# Patient Record
Sex: FEMALE | Race: BLACK OR AFRICAN AMERICAN | NOT HISPANIC OR LATINO | URBAN - METROPOLITAN AREA
[De-identification: names, ages, dates, MRNs, and addresses within clinical notes are randomized per-mention and may not be internally consistent; named-entity substitution may affect disease eponyms.]

---

## 2021-04-14 ENCOUNTER — EMERGENCY (EMERGENCY)
Facility: HOSPITAL | Age: 26
LOS: 1 days | Discharge: HOME | End: 2021-04-14
Attending: EMERGENCY MEDICINE | Admitting: EMERGENCY MEDICINE
Payer: COMMERCIAL

## 2021-04-14 VITALS
RESPIRATION RATE: 16 BRPM | WEIGHT: 132.06 LBS | DIASTOLIC BLOOD PRESSURE: 86 MMHG | HEIGHT: 63 IN | HEART RATE: 101 BPM | TEMPERATURE: 98 F | OXYGEN SATURATION: 99 % | SYSTOLIC BLOOD PRESSURE: 126 MMHG

## 2021-04-14 DIAGNOSIS — Z3A.01 LESS THAN 8 WEEKS GESTATION OF PREGNANCY: ICD-10-CM

## 2021-04-14 DIAGNOSIS — O99.891 OTHER SPECIFIED DISEASES AND CONDITIONS COMPLICATING PREGNANCY: ICD-10-CM

## 2021-04-14 DIAGNOSIS — R00.0 TACHYCARDIA, UNSPECIFIED: ICD-10-CM

## 2021-04-14 DIAGNOSIS — Z20.822 CONTACT WITH AND (SUSPECTED) EXPOSURE TO COVID-19: ICD-10-CM

## 2021-04-14 LAB
APTT BLD: 30.5 SEC — SIGNIFICANT CHANGE UP (ref 27.5–35.5)
BLD GP AB SCN SERPL QL: NEGATIVE — SIGNIFICANT CHANGE UP
HCT VFR BLD CALC: 32.8 % — LOW (ref 34.5–45)
HGB BLD-MCNC: 11.9 G/DL — SIGNIFICANT CHANGE UP (ref 11.5–15.5)
INR BLD: 1.14 — SIGNIFICANT CHANGE UP (ref 0.88–1.16)
MCHC RBC-ENTMCNC: 26.9 PG — LOW (ref 27–34)
MCHC RBC-ENTMCNC: 36.3 GM/DL — HIGH (ref 32–36)
MCV RBC AUTO: 74.2 FL — LOW (ref 80–100)
NRBC # BLD: 0 /100 WBCS — SIGNIFICANT CHANGE UP (ref 0–0)
PLATELET # BLD AUTO: 350 K/UL — SIGNIFICANT CHANGE UP (ref 150–400)
PROTHROM AB SERPL-ACNC: 13.6 SEC — SIGNIFICANT CHANGE UP (ref 10.6–13.6)
RBC # BLD: 4.42 M/UL — SIGNIFICANT CHANGE UP (ref 3.8–5.2)
RBC # FLD: 13.7 % — SIGNIFICANT CHANGE UP (ref 10.3–14.5)
RH IG SCN BLD-IMP: POSITIVE — SIGNIFICANT CHANGE UP
WBC # BLD: 11.34 K/UL — HIGH (ref 3.8–10.5)
WBC # FLD AUTO: 11.34 K/UL — HIGH (ref 3.8–10.5)

## 2021-04-14 PROCEDURE — 84702 CHORIONIC GONADOTROPIN TEST: CPT

## 2021-04-14 PROCEDURE — 99284 EMERGENCY DEPT VISIT MOD MDM: CPT

## 2021-04-14 PROCEDURE — 86901 BLOOD TYPING SEROLOGIC RH(D): CPT

## 2021-04-14 PROCEDURE — 80053 COMPREHEN METABOLIC PANEL: CPT

## 2021-04-14 PROCEDURE — 76817 TRANSVAGINAL US OBSTETRIC: CPT

## 2021-04-14 PROCEDURE — 81001 URINALYSIS AUTO W/SCOPE: CPT

## 2021-04-14 PROCEDURE — 86850 RBC ANTIBODY SCREEN: CPT

## 2021-04-14 PROCEDURE — 85730 THROMBOPLASTIN TIME PARTIAL: CPT

## 2021-04-14 PROCEDURE — 86900 BLOOD TYPING SEROLOGIC ABO: CPT

## 2021-04-14 PROCEDURE — 87086 URINE CULTURE/COLONY COUNT: CPT

## 2021-04-14 PROCEDURE — 76801 OB US < 14 WKS SINGLE FETUS: CPT | Mod: 26

## 2021-04-14 PROCEDURE — 76801 OB US < 14 WKS SINGLE FETUS: CPT

## 2021-04-14 PROCEDURE — 85027 COMPLETE CBC AUTOMATED: CPT

## 2021-04-14 PROCEDURE — 76817 TRANSVAGINAL US OBSTETRIC: CPT | Mod: 26

## 2021-04-14 PROCEDURE — U0003: CPT

## 2021-04-14 PROCEDURE — 36415 COLL VENOUS BLD VENIPUNCTURE: CPT

## 2021-04-14 PROCEDURE — 85025 COMPLETE CBC W/AUTO DIFF WBC: CPT

## 2021-04-14 PROCEDURE — 85610 PROTHROMBIN TIME: CPT

## 2021-04-14 PROCEDURE — U0005: CPT

## 2021-04-14 PROCEDURE — 99285 EMERGENCY DEPT VISIT HI MDM: CPT

## 2021-04-14 RX ORDER — SODIUM CHLORIDE 9 MG/ML
3 INJECTION INTRAMUSCULAR; INTRAVENOUS; SUBCUTANEOUS EVERY 8 HOURS
Refills: 0 | Status: DISCONTINUED | OUTPATIENT
Start: 2021-04-14 | End: 2021-04-18

## 2021-04-14 NOTE — CONSULT NOTE ADULT - SUBJECTIVE AND OBJECTIVE BOX
26yo  at 5w2d by LMP 3/8 presents here after abnormal finding on sonogram out [facility].  She denies any pain, vaginal bleeding, lightheadedness, SOB, palpitations, etc.  States she took a UPT at home that was + x2 and went to [facility] for her first prenatal appointment.  On sonogram today in office, the provider was concerned for free fluid and an abnormal looking adnexal structure so the patient was sent here.  She remains asymptomatic.  Pt denies fever, chills, chest pain, SOB, abdominal pain, nausea, vomiting, vaginal bleeding.      OB/GYN Hx: G1 - miscarriage v chemical pregnancy at 2w GA, G2 - current, desired; hx of 1x ovarian cyst managed by observation; denies STIs/fibroids  PMHx: eczema  SHx: intussusception repair as a child, hernia repair as a child  Meds: cyproheptidine PO PRN for eczema  Allergies: NKDA    PHYSICAL EXAM:   Vital Signs Last 24 Hrs  T(C): 36.8 (2021 18:53), Max: 36.8 (2021 18:53)  T(F): 98.3 (2021 18:53), Max: 98.3 (2021 18:53)  HR: 101 (2021 18:53) (101 - 101)  BP: 126/86 (2021 18:53) (126/86 - 126/86)  BP(mean): --  RR: 16 (2021 18:53) (16 - 16)  SpO2: 99% (2021 18:53) (99% - 99%)    **************************  Constitutional: Alert & Oriented x3, No acute distress  Respiratory: Clear to ausculation bilaterally; no wheezing, rhonchi, or crackles  Cardiovascular: regular rate and rhythm, no murmurs, or gallops  Gastrointestinal: soft, non tender, positive bowel sounds, no rebound or guarding   Pelvic exam: normal appearing external genitalia, normal appearing vaginal mucosa, normal appearing cervix - appears closed, no abnormal vaginal discharge, no vaginal bleeding; no CMT, no adnexal masses or fullness palpated, nontender to palpation  Extremities: no calf tenderness or swelling      LABS:                        12.8   10.80 )-----------( 262      ( 2021 19:30 )             36.5         135  |  100  |  11  ----------------------------<  90  4.5   |  21<L>  |  0.62    Ca    9.6      2021 19:30    TPro  7.9  /  Alb  4.7  /  TBili  0.4  /  DBili  x   /  AST  23  /  ALT  11  /  AlkPhos  79  -14    PT/INR - ( 2021 20:37 )   PT: 13.6 sec;   INR: 1.14          PTT - ( 2021 20:37 )  PTT:30.5 sec    HCG Quantitative, Serum: 1089 mIU/mL ( @ 19:30)      RADIOLOGY & ADDITIONAL STUDIES:  Repeat TVUS pending 24yo  at 5w2d by LMP 3/8 presents here after abnormal finding on sonogram out [facility].  She denies any pain, vaginal bleeding, lightheadedness, SOB, palpitations, etc.  States she took a UPT at home that was + x2 and went to [facility] for her first prenatal appointment.  On sonogram today in office, the provider was concerned for free fluid and an abnormal looking adnexal structure so the patient was sent here.  She remains asymptomatic.  Pt denies fever, chills, chest pain, SOB, abdominal pain, nausea, vomiting, vaginal bleeding.      OB/GYN Hx: G1 - miscarriage v chemical pregnancy at 2w GA, G2 - current, desired; hx of 1x ovarian cyst managed by observation; denies STIs/fibroids  PMHx: eczema  SHx: intussusception repair as a child, hernia repair as a child  Meds: cyproheptidine PO PRN for eczema  Allergies: NKDA    PHYSICAL EXAM:   Vital Signs Last 24 Hrs  T(C): 36.8 (2021 18:53), Max: 36.8 (2021 18:53)  T(F): 98.3 (2021 18:53), Max: 98.3 (2021 18:53)  HR: 101 (2021 18:53) (101 - 101)  BP: 126/86 (2021 18:53) (126/86 - 126/86)  BP(mean): --  RR: 16 (2021 18:53) (16 - 16)  SpO2: 99% (2021 18:53) (99% - 99%)    **************************  Constitutional: Alert & Oriented x3, No acute distress  Respiratory: Clear to ausculation bilaterally; no wheezing, rhonchi, or crackles  Cardiovascular: regular rate and rhythm, no murmurs, or gallops  Gastrointestinal: soft, non tender, positive bowel sounds, no rebound or guarding   Pelvic exam: normal appearing external genitalia, normal appearing vaginal mucosa, normal appearing cervix - appears closed, no abnormal vaginal discharge, no vaginal bleeding; no CMT, no adnexal masses or fullness palpated, nontender to palpation  Extremities: no calf tenderness or swelling      LABS:                        12.8   10.80 )-----------( 262      ( 2021 19:30 )             36.5         135  |  100  |  11  ----------------------------<  90  4.5   |  21<L>  |  0.62    Ca    9.6      2021 19:30    TPro  7.9  /  Alb  4.7  /  TBili  0.4  /  DBili  x   /  AST  23  /  ALT  11  /  AlkPhos  79  -14    PT/INR - ( 2021 20:37 )   PT: 13.6 sec;   INR: 1.14          PTT - ( 2021 20:37 )  PTT:30.5 sec    HCG Quantitative, Serum: 1089 mIU/mL ( @ 19:30)      RADIOLOGY & ADDITIONAL STUDIES:  Repeat TVUS final read pending

## 2021-04-14 NOTE — ED PROVIDER NOTE - PROGRESS NOTE DETAILS
GYN consult called. US and lab results d/w GYN and GYN has evaluated pt.  ? IUP but cannot r/o corpus luteum or ruptured ectopic.  Patient reevaluated: remains completely asymptomatic, has no pain, bleeding, lightheadedness.  GYN cleared for DC to return on Friday for repeat HCG and repeat US.  Discussed at length with patient and her partner the importance of this in case of ectopic.  Strict return precautions given and both expressed clear understanding with teach-back.

## 2021-04-14 NOTE — ED PROVIDER NOTE - CLINICAL SUMMARY MEDICAL DECISION MAKING FREE TEXT BOX
Sent to ED for ? ectopic/preg unknown location.  Completely asymptomatic.  Abd benign.  Mildly tachycardic, but patient states anxiety causing this.  Plan: labs, US, GYN consult

## 2021-04-14 NOTE — ED PROVIDER NOTE - NSFOLLOWUPINSTRUCTIONS_ED_ALL_ED_FT
Return to [facility] ER on Friday for a repeat HCG level AND a repeat ultrasound; GYN will re-evaluate you then.    Return to the Emergency Department immediately if you have any new or worsening symptoms, or if you have any concerns.  ========================    Ectopic Pregnancy       An ectopic pregnancy is when the fertilized egg attaches (implants) outside the uterus. Most ectopic pregnancies occur in one of the tubes where eggs travel from the ovary to the uterus (fallopian tubes), but the implanting can occur in other locations. In rare cases, ectopic pregnancies occur on the ovary, intestine, pelvis, abdomen, or cervix. In an ectopic pregnancy, the fertilized egg does not have the ability to develop into a normal, healthy baby.    A ruptured ectopic pregnancy is one in which tearing or bursting of a fallopian tube causes internal bleeding. Often, there is intense lower abdominal pain, and vaginal bleeding sometimes occurs. Having an ectopic pregnancy can be life-threatening. If this dangerous condition is not treated, it can lead to blood loss, shock, or even death.      What are the causes?    The most common cause of this condition is damage to one of the fallopian tubes. A fallopian tube may be narrowed or blocked, and that keeps the fertilized egg from reaching the uterus.      What increases the risk?    This condition is more likely to develop in women of childbearing age who have different levels of risk. The levels of risk can be divided into three categories.    High risk      •You have gone through infertility treatment.      •You have had an ectopic pregnancy before.      •You have had surgery on the fallopian tubes, or another surgical procedure, such as an .      •You have had surgery to have the fallopian tubes tied (tubal ligation).      •You have problems or diseases of the fallopian tubes.      •You have been exposed to diethylstilbestrol (LYUDMILA). This medicine was used until , and it had effects on babies whose mothers took the medicine.      •You become pregnant while using an IUD (intrauterine device) for birth control.      Moderate risk      •You have a history of infertility.      •You have had an STI (sexually transmitted infection).      •You have a history of pelvic inflammatory disease (PID).      •You have scarring from endometriosis.      •You have multiple sexual partners.      •You smoke.      Low risk      •You have had pelvic surgery.      •You use vaginal douches.      •You became sexually active before age 18.        What are the signs or symptoms?  Common symptoms of this condition include normal pregnancy symptoms, such as missing a period, nausea, tiredness, abdominal pain, breast tenderness, and bleeding. However, ectopic pregnancy will have additional symptoms, such as:  •Pain with intercourse.      •Irregular vaginal bleeding or spotting.      •Cramping or pain on one side or in the lower abdomen.      •Fast heartbeat, low blood pressure, and sweating.      •Passing out while having a bowel movement.    Symptoms of a ruptured ectopic pregnancy and internal bleeding may include:  •Sudden, severe pain in the abdomen and pelvis.      •Dizziness, weakness, light-headedness, or fainting.      •Pain in the shoulder or neck area.        How is this diagnosed?  This condition is diagnosed by:  •A pelvic exam to locate pain or a mass in the abdomen.      •A pregnancy test. This blood test checks for the presence as well as the specific level of pregnancy hormone in the bloodstream.      •Ultrasound. This is performed if a pregnancy test is positive. In this test, a probe is inserted into the vagina. The probe will detect a fetus, possibly in a location other than the uterus.      •Taking a sample of uterus tissue (dilation and curettage, or D&C).      •Surgery to perform a visual exam of the inside of the abdomen using a thin, lighted tube that has a tiny camera on the end (laparoscope).      •Culdocentesis. This procedure involves inserting a needle at the top of the vagina, behind the uterus. If blood is present in this area, it may indicate that a fallopian tube is torn.        How is this treated?    This condition is treated with medicine or surgery.    Medicine    •An injection of a medicine (methotrexate) may be given to cause the pregnancy tissue to be absorbed. This medicine may save your fallopian tube. It may be given if:  •The diagnosis is made early, with no signs of active bleeding.      •The fallopian tube has not ruptured.      •You are considered to be a good candidate for the medicine.        Usually, pregnancy hormone blood levels are checked after methotrexate treatment. This is to be sure that the medicine is effective. It may take 4–6 weeks for the pregnancy to be absorbed. Most pregnancies will be absorbed by 3 weeks.    Surgery      •A laparoscope may be used to remove the pregnancy tissue.      •If severe internal bleeding occurs, a larger cut (incision) may be made in the lower abdomen (laparotomy) to remove the fetus and placenta. This is done to stop the bleeding.      •Part or all of the fallopian tube may be removed (salpingectomy) along with the fetus and placenta. The fallopian tube may also be repaired during the surgery.      •In very rare circumstances, removal of the uterus (hysterectomy) may be required.      •After surgery, pregnancy hormone testing may be done to be sure that there is no pregnancy tissue left.    Whether your treatment is medicine or surgery, you may receive a Rho (D) immune globulin shot to prevent problems with any future pregnancy. This shot may be given if:  •You are Rh-negative and the baby's father is Rh-positive.      •You are Rh-negative and you do not know the Rh type of the baby's father.        Follow these instructions at home:    •Rest and limit your activity after the procedure for as long as told by your health care provider.    •Until your health care provider says that it is safe:  •Do not lift anything that is heavier than 10 lb (4.5 kg), or the limit that your health care provider tells you.      •Avoid physical exercise and any movement that requires effort (is strenuous).      •To help prevent constipation:  •Eat a healthy diet that includes fruits, vegetables, and whole grains.      •Drink 6–8 glasses of water per day.          Get help right away if:    •You develop worsening pain that is not relieved by medicine.    •You have:  •A fever or chills.      •Vaginal bleeding.      •Redness and swelling at the incision site.      •Nausea and vomiting.        •You feel dizzy or weak.      •You feel light-headed or you faint.      This information is not intended to replace advice given to you by your health care provider. Make sure you discuss any questions you have with your health care provider.

## 2021-04-14 NOTE — ED PROVIDER NOTE - OBJECTIVE STATEMENT
26 y/o F pt presents to ED with possible ectopic pregnancy. She is 5 weeks and 2 days pregnant by date, with positive home pregnancy test over the weekend, and this is a desired pregnancy. She went to [facility] today and had US done that was suspicious for possible ruptured, ectopic pregnancy. It is known what her HCG level was at the GYN office, and she was referred to ED for further evaluation. Denies weakness, lightheadedness, fainting, abdominal pain or pelvic pain, vaginal bleeding, or any other acute complaints. This is her second pregnancy, her first pregnancy resulted in miscarriage this past Jan.     No hx of gynecologic surgeries or STDs.  Hx of intestinal surgery as an infant.  Only meds is cyproheptadine, she has been taking prenatal vitamins.

## 2021-04-14 NOTE — ED PROVIDER NOTE - PATIENT PORTAL LINK FT
You can access the FollowMyHealth Patient Portal offered by [facility] by registering at the following website: [email or web address]. By joining [facility]’s FollowMyHealth portal, you will also be able to view your health information using other applications (apps) compatible with our system.

## 2021-04-14 NOTE — ED ADULT NURSE REASSESSMENT NOTE - NS ED NURSE REASSESS COMMENT FT1
Patient aoX 3, anxious and tearful, no c/o of abdominal pain, vaginal bleed, no dizziness/weakness,  no nausea/vomitting, no other symptom complaint.  Vital signs stable.  Additional lab, repeat CBC sent to lab.  US done.  Patient seen by OB GYNE.  Results and disposition pending.

## 2021-04-14 NOTE — CONSULT NOTE ADULT - ASSESSMENT
26yo  at 5w2d by LMP 3/8 presents here after abnormal finding on sonogram with concern for free fluid and an abnormal looking adnexal structure at [facility].  She denies any symptoms - pain, vaginal bleeding, lightheadedness, SOB, palpitations, etc.  Clinically and hemodynamically stable.    Pregnancy of unknown location  Sonogram findings at office concerning for ruptured ectopic  CBC and CMP WNL (H 12.8)  T&S pending  Please repeat TVUS as patient is clinically and hemodynamically stable  Please obtain COVID swab    D/W Dr. Cesar 26yo  at 5w2d by LMP 3/8 presents here after abnormal finding on sonogram with concern for free fluid and an abnormal looking adnexal structure at [facility].  She denies any symptoms - pain, vaginal bleeding, lightheadedness, SOB, palpitations, etc.  Clinically and hemodynamically stable.    Pregnancy of unknown location  Sonogram findings at office concerning for ruptured ectopic  CBC and CMP WNL (H 12.8) --> received 1 L IVF --> repeat H stable at 11.9  O pos blood type  COVID swab pending  Repeat TVUS discussed with radiology resident shows moderate free fluid in pelvis with R sided suspected corpus luteum in ovary but cannot r/o ruptured ectopic as there is free fluid.    Patient remains completely stable clinically, including normotensive and benign pelvic and abdominal exam.  Of note, patient is anxious.  She was counseled with her partner thoroughly with Chief resident regarding options of diagnostic laparoscopy v close monitoring of any symptom and f/u in 48h  -- Patient and partner opted for 48h repeat BHCG and sonogram   -- She was counseled to monitor herself for any pain, lightheadedness, bleeding, etc  -- She understands risks and ensures she will follow up   Patient stable for discharge with no strenuous activity.    D/W Dr. Cesar

## 2021-04-14 NOTE — ED ADULT TRIAGE NOTE - CHIEF COMPLAINT QUOTE
Patient was sent to ED by OB/GYN for concern ectopic pregnancy. Patient denies abdominal pain. LMP 03/08/2021

## 2021-04-14 NOTE — ED ADULT NURSE NOTE - OBJECTIVE STATEMENT
24 y/o female received into the ED, axox3, stating that she was sent to the ED by her OBGYN for r/o ectopic pregnancy.  Pt. states that she was at her first OBGYN pregnancy check up and was told that fluid was seen in the fallopian tubes and was also told that no intrauterine pregnancy was observed.  Pt. denies having any pain or vaginal bleeding at this time.

## 2021-04-15 VITALS — RESPIRATION RATE: 16 BRPM | OXYGEN SATURATION: 99 % | TEMPERATURE: 98 F

## 2021-04-15 LAB — SARS-COV-2 RNA SPEC QL NAA+PROBE: SIGNIFICANT CHANGE UP

## 2021-04-16 ENCOUNTER — EMERGENCY (EMERGENCY)
Facility: HOSPITAL | Age: 26
LOS: 1 days | Discharge: HOME | End: 2021-04-16
Attending: EMERGENCY MEDICINE | Admitting: EMERGENCY MEDICINE
Payer: COMMERCIAL

## 2021-04-16 VITALS
SYSTOLIC BLOOD PRESSURE: 126 MMHG | RESPIRATION RATE: 16 BRPM | OXYGEN SATURATION: 99 % | HEART RATE: 81 BPM | DIASTOLIC BLOOD PRESSURE: 78 MMHG | TEMPERATURE: 98 F

## 2021-04-16 VITALS
HEART RATE: 89 BPM | HEIGHT: 63 IN | DIASTOLIC BLOOD PRESSURE: 75 MMHG | SYSTOLIC BLOOD PRESSURE: 127 MMHG | RESPIRATION RATE: 17 BRPM | WEIGHT: 132.06 LBS | OXYGEN SATURATION: 100 % | TEMPERATURE: 98 F

## 2021-04-16 DIAGNOSIS — Z20.822 CONTACT WITH AND (SUSPECTED) EXPOSURE TO COVID-19: ICD-10-CM

## 2021-04-16 DIAGNOSIS — O46.91 ANTEPARTUM HEMORRHAGE, UNSPECIFIED, FIRST TRIMESTER: ICD-10-CM

## 2021-04-16 DIAGNOSIS — O36.80X0 PREGNANCY WITH INCONCLUSIVE FETAL VIABILITY, NOT APPLICABLE OR UNSPECIFIED: ICD-10-CM

## 2021-04-16 DIAGNOSIS — Z3A.01 LESS THAN 8 WEEKS GESTATION OF PREGNANCY: ICD-10-CM

## 2021-04-16 LAB
ALBUMIN SERPL ELPH-MCNC: 4.5 G/DL — SIGNIFICANT CHANGE UP (ref 3.3–5)
ALBUMIN SERPL ELPH-MCNC: 4.5 G/DL — SIGNIFICANT CHANGE UP (ref 3.3–5)
ALP SERPL-CCNC: 76 U/L — SIGNIFICANT CHANGE UP (ref 40–120)
ALP SERPL-CCNC: 76 U/L — SIGNIFICANT CHANGE UP (ref 40–120)
ALT FLD-CCNC: 11 U/L — SIGNIFICANT CHANGE UP (ref 10–45)
ALT FLD-CCNC: SIGNIFICANT CHANGE UP (ref 10–45)
ANION GAP SERPL CALC-SCNC: 11 MMOL/L — SIGNIFICANT CHANGE UP (ref 5–17)
ANION GAP SERPL CALC-SCNC: 13 MMOL/L — SIGNIFICANT CHANGE UP (ref 5–17)
ANISOCYTOSIS BLD QL: SLIGHT — SIGNIFICANT CHANGE UP
APPEARANCE UR: CLEAR — SIGNIFICANT CHANGE UP
AST SERPL-CCNC: 19 U/L — SIGNIFICANT CHANGE UP (ref 10–40)
AST SERPL-CCNC: SIGNIFICANT CHANGE UP (ref 10–40)
BACTERIA # UR AUTO: PRESENT /HPF
BASOPHILS # BLD AUTO: 0 K/UL — SIGNIFICANT CHANGE UP (ref 0–0.2)
BASOPHILS NFR BLD AUTO: 0 % — SIGNIFICANT CHANGE UP (ref 0–2)
BILIRUB SERPL-MCNC: 0.5 MG/DL — SIGNIFICANT CHANGE UP (ref 0.2–1.2)
BILIRUB SERPL-MCNC: 0.5 MG/DL — SIGNIFICANT CHANGE UP (ref 0.2–1.2)
BILIRUB UR-MCNC: NEGATIVE — SIGNIFICANT CHANGE UP
BLD GP AB SCN SERPL QL: NEGATIVE — SIGNIFICANT CHANGE UP
BUN SERPL-MCNC: 8 MG/DL — SIGNIFICANT CHANGE UP (ref 7–23)
BUN SERPL-MCNC: 8 MG/DL — SIGNIFICANT CHANGE UP (ref 7–23)
CALCIUM SERPL-MCNC: 9.6 MG/DL — SIGNIFICANT CHANGE UP (ref 8.4–10.5)
CALCIUM SERPL-MCNC: 9.7 MG/DL — SIGNIFICANT CHANGE UP (ref 8.4–10.5)
CHLORIDE SERPL-SCNC: 102 MMOL/L — SIGNIFICANT CHANGE UP (ref 96–108)
CHLORIDE SERPL-SCNC: 102 MMOL/L — SIGNIFICANT CHANGE UP (ref 96–108)
CO2 SERPL-SCNC: 22 MMOL/L — SIGNIFICANT CHANGE UP (ref 22–31)
CO2 SERPL-SCNC: 23 MMOL/L — SIGNIFICANT CHANGE UP (ref 22–31)
COLOR SPEC: YELLOW — SIGNIFICANT CHANGE UP
COMMENT - URINE: SIGNIFICANT CHANGE UP
CREAT SERPL-MCNC: 0.61 MG/DL — SIGNIFICANT CHANGE UP (ref 0.5–1.3)
CREAT SERPL-MCNC: 0.63 MG/DL — SIGNIFICANT CHANGE UP (ref 0.5–1.3)
DIFF PNL FLD: NEGATIVE — SIGNIFICANT CHANGE UP
EOSINOPHIL # BLD AUTO: 0.09 K/UL — SIGNIFICANT CHANGE UP (ref 0–0.5)
EOSINOPHIL NFR BLD AUTO: 0.9 % — SIGNIFICANT CHANGE UP (ref 0–6)
EPI CELLS # UR: ABNORMAL /HPF (ref 0–5)
GIANT PLATELETS BLD QL SMEAR: PRESENT — SIGNIFICANT CHANGE UP
GLUCOSE SERPL-MCNC: 93 MG/DL — SIGNIFICANT CHANGE UP (ref 70–99)
GLUCOSE SERPL-MCNC: 93 MG/DL — SIGNIFICANT CHANGE UP (ref 70–99)
GLUCOSE UR QL: NEGATIVE — SIGNIFICANT CHANGE UP
HCG SERPL-ACNC: 2453 MIU/ML — HIGH
HCT VFR BLD CALC: 33.1 % — LOW (ref 34.5–45)
HGB BLD-MCNC: 11.8 G/DL — SIGNIFICANT CHANGE UP (ref 11.5–15.5)
HYPOCHROMIA BLD QL: SLIGHT — SIGNIFICANT CHANGE UP
KETONES UR-MCNC: >=80 MG/DL
LEUKOCYTE ESTERASE UR-ACNC: NEGATIVE — SIGNIFICANT CHANGE UP
LYMPHOCYTES # BLD AUTO: 3.47 K/UL — HIGH (ref 1–3.3)
LYMPHOCYTES # BLD AUTO: 36.5 % — SIGNIFICANT CHANGE UP (ref 13–44)
MACROCYTES BLD QL: SLIGHT — SIGNIFICANT CHANGE UP
MANUAL SMEAR VERIFICATION: SIGNIFICANT CHANGE UP
MCHC RBC-ENTMCNC: 26.3 PG — LOW (ref 27–34)
MCHC RBC-ENTMCNC: 35.6 GM/DL — SIGNIFICANT CHANGE UP (ref 32–36)
MCV RBC AUTO: 73.9 FL — LOW (ref 80–100)
MICROCYTES BLD QL: SLIGHT — SIGNIFICANT CHANGE UP
MONOCYTES # BLD AUTO: 0.49 K/UL — SIGNIFICANT CHANGE UP (ref 0–0.9)
MONOCYTES NFR BLD AUTO: 5.2 % — SIGNIFICANT CHANGE UP (ref 2–14)
NEUTROPHILS # BLD AUTO: 5.37 K/UL — SIGNIFICANT CHANGE UP (ref 1.8–7.4)
NEUTROPHILS NFR BLD AUTO: 56.5 % — SIGNIFICANT CHANGE UP (ref 43–77)
NITRITE UR-MCNC: NEGATIVE — SIGNIFICANT CHANGE UP
PH UR: 6.5 — SIGNIFICANT CHANGE UP (ref 5–8)
PLAT MORPH BLD: ABNORMAL
PLATELET # BLD AUTO: 347 K/UL — SIGNIFICANT CHANGE UP (ref 150–400)
POLYCHROMASIA BLD QL SMEAR: SLIGHT — SIGNIFICANT CHANGE UP
POTASSIUM SERPL-MCNC: 3.8 MMOL/L — SIGNIFICANT CHANGE UP (ref 3.5–5.3)
POTASSIUM SERPL-MCNC: SIGNIFICANT CHANGE UP (ref 3.5–5.3)
POTASSIUM SERPL-SCNC: 3.8 MMOL/L — SIGNIFICANT CHANGE UP (ref 3.5–5.3)
POTASSIUM SERPL-SCNC: SIGNIFICANT CHANGE UP (ref 3.5–5.3)
PROT SERPL-MCNC: 8 G/DL — SIGNIFICANT CHANGE UP (ref 6–8.3)
PROT SERPL-MCNC: 8.1 G/DL — SIGNIFICANT CHANGE UP (ref 6–8.3)
PROT UR-MCNC: ABNORMAL MG/DL
RBC # BLD: 4.48 M/UL — SIGNIFICANT CHANGE UP (ref 3.8–5.2)
RBC # FLD: 13.6 % — SIGNIFICANT CHANGE UP (ref 10.3–14.5)
RBC BLD AUTO: ABNORMAL
RBC CASTS # UR COMP ASSIST: < 5 /HPF — SIGNIFICANT CHANGE UP
RH IG SCN BLD-IMP: POSITIVE — SIGNIFICANT CHANGE UP
SARS-COV-2 RNA SPEC QL NAA+PROBE: SIGNIFICANT CHANGE UP
SODIUM SERPL-SCNC: 136 MMOL/L — SIGNIFICANT CHANGE UP (ref 135–145)
SODIUM SERPL-SCNC: 137 MMOL/L — SIGNIFICANT CHANGE UP (ref 135–145)
SP GR SPEC: 1.02 — SIGNIFICANT CHANGE UP (ref 1–1.03)
SPHEROCYTES BLD QL SMEAR: SLIGHT — SIGNIFICANT CHANGE UP
UROBILINOGEN FLD QL: 0.2 E.U./DL — SIGNIFICANT CHANGE UP
VARIANT LYMPHS # BLD: 0.9 % — SIGNIFICANT CHANGE UP (ref 0–6)
WBC # BLD: 9.5 K/UL — SIGNIFICANT CHANGE UP (ref 3.8–10.5)
WBC # FLD AUTO: 9.5 K/UL — SIGNIFICANT CHANGE UP (ref 3.8–10.5)
WBC UR QL: < 5 /HPF — SIGNIFICANT CHANGE UP

## 2021-04-16 PROCEDURE — 99284 EMERGENCY DEPT VISIT MOD MDM: CPT | Mod: 25

## 2021-04-16 PROCEDURE — 36415 COLL VENOUS BLD VENIPUNCTURE: CPT

## 2021-04-16 PROCEDURE — 76801 OB US < 14 WKS SINGLE FETUS: CPT

## 2021-04-16 PROCEDURE — 80053 COMPREHEN METABOLIC PANEL: CPT

## 2021-04-16 PROCEDURE — 86850 RBC ANTIBODY SCREEN: CPT

## 2021-04-16 PROCEDURE — 86901 BLOOD TYPING SEROLOGIC RH(D): CPT

## 2021-04-16 PROCEDURE — 84702 CHORIONIC GONADOTROPIN TEST: CPT

## 2021-04-16 PROCEDURE — 81001 URINALYSIS AUTO W/SCOPE: CPT

## 2021-04-16 PROCEDURE — 36000 PLACE NEEDLE IN VEIN: CPT

## 2021-04-16 PROCEDURE — 76817 TRANSVAGINAL US OBSTETRIC: CPT

## 2021-04-16 PROCEDURE — 85025 COMPLETE CBC W/AUTO DIFF WBC: CPT

## 2021-04-16 PROCEDURE — 87635 SARS-COV-2 COVID-19 AMP PRB: CPT

## 2021-04-16 PROCEDURE — 86900 BLOOD TYPING SEROLOGIC ABO: CPT

## 2021-04-16 PROCEDURE — 76817 TRANSVAGINAL US OBSTETRIC: CPT | Mod: 26

## 2021-04-16 PROCEDURE — 76801 OB US < 14 WKS SINGLE FETUS: CPT | Mod: 26

## 2021-04-16 PROCEDURE — 99284 EMERGENCY DEPT VISIT MOD MDM: CPT

## 2021-04-16 RX ORDER — SODIUM CHLORIDE 9 MG/ML
1000 INJECTION INTRAMUSCULAR; INTRAVENOUS; SUBCUTANEOUS ONCE
Refills: 0 | Status: COMPLETED | OUTPATIENT
Start: 2021-04-16 | End: 2021-04-16

## 2021-04-16 RX ADMIN — SODIUM CHLORIDE 1000 MILLILITER(S): 9 INJECTION INTRAMUSCULAR; INTRAVENOUS; SUBCUTANEOUS at 14:02

## 2021-04-16 NOTE — ED PROVIDER NOTE - CHPI ED SYMPTOMS NEG
no pelvic pain or cramps/no abdominal pain/no dysuria/no fever/no nausea/no vaginal discharge/no vomiting/no chills

## 2021-04-16 NOTE — ED PROVIDER NOTE - CLINICAL SUMMARY MEDICAL DECISION MAKING FREE TEXT BOX
pregnancy of unknown location and fu for repeat labs and us. pt feeling well. no sx's. labs noted. hcg doubled. us done and still unknown location. pt evaluated in ED by gyn and recommend f/u in outpt clinic. ectopic precautions d/w pt.

## 2021-04-16 NOTE — ED PROVIDER NOTE - OBJECTIVE STATEMENT
26yo F with no pertinent PmHx presents to the ED today for repeat ultrasound and blood work due to a pregnancy issue. States she was last seen two days ago for the evaluation of a possible ectopic pregnancy. On that visit, she was told that the results of testing were unclear, and what was thought to be a ectopic pregnancy was likely a cyst. Currently five weeks pregnant. Denies pelvic pain, abdominal pain, cramps, vaginal discharge, urinary symptoms, fever, chills, nausea, or vomiting,

## 2021-04-16 NOTE — CHART NOTE - NSCHARTNOTEFT_GEN_A_CORE
Pt was called several times with no answer. Voicemail was left reminding her to follow up in the ED today.

## 2021-04-16 NOTE — ED PROVIDER NOTE - PATIENT PORTAL LINK FT
You can access the FollowMyHealth Patient Portal offered by [facility] by registering at the following website: [email or web address]. By joining [facility]’s FollowMyHealth portal, you will also be able to view your health information using other applications (apps) compatible with our system.

## 2021-04-16 NOTE — ED PROVIDER NOTE - NSFOLLOWUPINSTRUCTIONS_ED_ALL_ED_FT
Follow up with GYN clinic in 1 wk.   You can follow up with Our OB/GYN clinic.   The # is [phone]  Located at [address] [place] [address]                Ectopic Pregnancy    WHAT YOU NEED TO KNOW:    Ectopic pregnancy occurs when a fertilized egg attaches and begins to grow outside of the uterus. The most common place for this to happen is in the fallopian tube. This is sometimes called a tubal pregnancy. The egg can also implant on the outside of the uterus, on the ovary or cervix, or in the abdomen. The egg may begin to grow, but the pregnancy cannot continue normally. Ectopic pregnancy can cause heavy bleeding and may be life-threatening.    DISCHARGE INSTRUCTIONS:    Call your local emergency number (911 in the ) if:   •You have chest pain or trouble breathing.          Call your doctor if:   •You have sharp pain in your lower abdomen that is severe and starts suddenly.      •You feel lightheaded or like you are going to faint.      •You have increasing abdominal or pelvic pain or heavy vaginal bleeding.      •You have shoulder pain.      •You have a fever.      •You have questions or concerns about your condition or care.      Medicines: You may need any of the following:   •Prescription pain medicine may be given. Ask your healthcare provider how to take this medicine safely. Some prescription pain medicines contain acetaminophen. Do not take other medicines that contain acetaminophen without talking to your healthcare provider. Too much acetaminophen may cause liver damage. Prescription pain medicine may cause constipation. Ask your healthcare provider how to prevent or treat constipation.       •Acetaminophen decreases pain and fever. It is available without a doctor's order. Ask how much to take and how often to take it. Follow directions. Read the labels of all other medicines you are using to see if they also contain acetaminophen, or ask your doctor or pharmacist. Acetaminophen can cause liver damage if not taken correctly. Do not use more than 4 grams (4,000 milligrams) total of acetaminophen in one day.       •Take your medicine as directed. Contact your healthcare provider if you think your medicine is not helping or if you have side effects. Tell him or her if you are allergic to any medicine. Keep a list of the medicines, vitamins, and herbs you take. Include the amounts, and when and why you take them. Bring the list or the pill bottles to follow-up visits. Carry your medicine list with you in case of an emergency.      If you received methotrexate:   •Do not have sex, and limit physical activity. Heavy physical activity or sexual intercourse may cause the ectopic pregnancy to rupture. This can be life-threatening. Your healthcare provider will tell you when it is okay to have sex and return to your normal activities.      •Do not get pregnant until your healthcare provider says it is okay. Methotrexate will be harmful to an unborn baby. You will need to wait until at least 1 monthly cycle after you finish the methotrexate course to get pregnant. Your provider may want you to wait until after you have had 3 monthly cycles. This will help make sure all the methotrexate is out of your body.      •Avoid folic acid and NSAID medicines. Folic acid, and NSAIDs, such as ibuprofen, prevent methotrexate from working correctly. Do not take folic acid supplements or have foods that are high in folic acid. Examples include orange juice, breakfast cereal, and leafy green vegetables. Your healthcare provider can give you more information on foods to avoid.      •You may have some spotting and abdominal pain. This may start a few days after you begin taking methotrexate. These are normal and should only last a short time. Talk to your healthcare provider if you have any concerns.      •Limit sunlight exposure. Sunlight can cause a condition caused methotrexate dermatitis (skin irritation).      For support and more information:   •The [employer]  [address] [address]  [place] [address]  Phone: [phone]  Phone: 1-146.983.7494  Web Address: http://www.acog.org        Follow up with your gynecologist as directed: You may need to return for a follow-up exam, treatment, or blood tests. If you received methotrexate to stop your pregnancy, it is important to come in for follow-up tests. Write down your questions so you remember to ask them during your visits.       © Copyright [employer] 2021           back to top                          © Copyright [employer] 2021

## 2021-04-16 NOTE — CONSULT NOTE ADULT - ASSESSMENT
25y   at 5w4d by Last Menstrual Period (3/8)   presents for f/u bHCG, previously seen in the ED on 3/6 for r/o ectopic pregnancy  - intrauterine gestational sac visualized on TVUS today. Pelvic free fluid likely 2/2 hemorraghic corpus luteal cyst.  - reassuring rise in bHCG (more than double) in the last 48 hours.  - benign history and physical today, ectopic pregnancy unlikely  - patient to f/u outpatient with Kathleen COOK in 1 week for repeat bHCG and TVUS  - d/w Junaid Valencia PGY4 and Dr. Tovar 25y   at 5w4d by Last Menstrual Period (3/8)   presents for f/u bHCG, previously seen in the ED on 3/6 for r/o ectopic pregnancy  - intrauterine gestational sac visualized on TVUS today. Pelvic free fluid likely 2/2 hemorraghic corpus luteal cyst.  - reassuring rise in bHCG (more than double) in the last 48 hours.  - benign history and physical today, ectopic pregnancy unlikely  - patient to f/u outpatient with [facility] on  for repeat bHCG and TVUS  - d/w Junaid Valencia PGY4 and Dr. Tovar

## 2021-04-16 NOTE — ED ADULT TRIAGE NOTE - OTHER COMPLAINTS
Per patient she is 5 weeks pregnant. She denies Vaginal bleeding or abd discomfort. Patient states her last HCG level was 1000 and "something was seen in the uterus but they found fluid in the fallopian tube."

## 2021-04-16 NOTE — ED PROVIDER NOTE - PHYSICAL EXAMINATION
VITAL SIGNS: I have reviewed nursing notes and confirm.  CONSTITUTIONAL: Well-developed; well-nourished; in no acute distress.   SKIN:  warm and dry, no acute rash.   HEAD:  normocephalic, atraumatic.  EYES: PERRL, EOM intact; conjunctiva and sclera clear.  ENT: No nasal discharge; airway clear.   NECK: Supple; non tender.  CARD: S1, S2 normal; no murmurs, gallops, or rubs. Regular rate and rhythm.   RESP:  Clear to auscultation b/l, no wheezes, rales or rhonchi.  ABD: Normal bowel sounds; soft; non-distended; non-tender; no guarding/ rebound.  EXT: Normal ROM. No clubbing, cyanosis or edema. 2+ pulses to b/l ue/le.  NEURO: Alert, oriented, grossly unremarkable  PSYCH: Cooperative, mood and affect appropriate. CONSTITUTIONAL: Well-developed; well-nourished; in no acute distress.   SKIN:  warm and dry, no acute rash.   HEAD:  normocephalic, atraumatic.  EYES: PERRL, EOM intact; conjunctiva and sclera clear.  ENT: No nasal discharge; airway clear.   NECK: Supple; non tender.  CARD: S1, S2 normal; no murmurs, gallops, or rubs. Regular rate and rhythm.   RESP:  Clear to auscultation b/l, no wheezes, rales or rhonchi.  ABD: Normal bowel sounds; soft; non-distended; non-tender; no guarding/ rebound.  EXT: Normal ROM. No clubbing, cyanosis or edema. 2+ pulses to b/l ue/le.  NEURO: Alert, oriented, grossly unremarkable  PSYCH: Cooperative, mood and affect appropriate. CONSTITUTIONAL: Well-developed; well-nourished; in no acute distress.   SKIN:  warm and dry, no acute rash.   HEAD:  normocephalic, atraumatic.  ENT: No nasal discharge; airway clear.   NECK: Supple; non tender.  CARD: S1, S2 normal; no murmurs, gallops, or rubs. Regular rate and rhythm.   RESP:  Clear to auscultation b/l, no wheezes, rales or rhonchi.  ABD: Normal bowel sounds; soft; non-distended; non-tender; no guarding/ rebound.

## 2021-04-16 NOTE — ED ADULT NURSE NOTE - OBJECTIVE STATEMENT
25y F, A&ox3, presents to ed for pregnancy complication by GYN. PT reports , hx of miscarriage. approximately 5 weeks pregnant. Sent in by GYN due to concern of "fluid in uterus" No bleeding, denies pain. No cp, no sob, no n/v/d, no abd nor back pain. no urinary s/s. IV placed, pending US.

## 2021-04-16 NOTE — CONSULT NOTE ADULT - SUBJECTIVE AND OBJECTIVE BOX
25y   at 5w4d with Last Menstrual Period 3/8   presents for f/u [facility]. Patient previously seen 2 days ago at the ED for r/o ruptured ectopic pregnancy after a right adnexal structure with free fluid in the pelvis was visualized at her outpatient clinic. Patient reports feeling well, denies any pain, vaginal bleeding, lightheadedness/dizziness. She reports some intermittent N/V since she was seen 2 days ago.     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    Medications: acyproheptadine, she has been taking prenatal vitamins  Allergies: nkda       Vital Signs Last 24 Hrs  T(C): 36.8 (2021 10:00), Max: 36.8 (2021 10:00)  T(F): 98.2 (2021 10:00), Max: 98.2 (2021 10:00)  HR: 89 (2021 10:00) (89 - 89)  BP: 127/75 (2021 10:00) (127/75 - 127/75)  BP(mean): --  RR: 17 (2021 10:00) (17 - 17)  SpO2: 100% (2021 10:00) (100% - 100%)    Physical Exam:  Gen: NAD, comfortable  GI: soft, nontender, nondistended + BS, no rebound no guarding  Ext: no edema, erythema, tenderness     LABS:                        11.8   9.50  )-----------( 347      ( 2021 10:43 )             33.1     04-16    136  |  102  |  8   ----------------------------<  93  3.8   |  23  |  0.63    Ca    9.7      2021 12:07    TPro  8.0  /  Alb  4.5  /  TBili  0.5  /  DBili  x   /  AST  19  /  ALT  11  /  AlkPhos  76  04-16    PT/INR - ( 2021 20:37 )   PT: 13.6 sec;   INR: 1.14          PTT - ( 2021 20:37 )  PTT:30.5 sec  Urinalysis Basic - ( 2021 10:43 )    Color: Yellow / Appearance: Clear / S.025 / pH: x  Gluc: x / Ketone: >=80 mg/dL  / Bili: Negative / Urobili: 0.2 E.U./dL   Blood: x / Protein: Trace mg/dL / Nitrite: NEGATIVE   Leuk Esterase: NEGATIVE / RBC: < 5 /HPF / WBC < 5 /HPF   Sq Epi: x / Non Sq Epi: Moderate /HPF / Bacteria: Present /HPF    [facility] : [id number]    RADIOLOGY & ADDITIONAL STUDIES:  FINDINGS:  By dates, the estimated gestational age is 5 weeks and 4 days..  A single probable intrauterine gestation is visible with an average ultrasound age of 5 weeks and 0 days.  Mean sac diameter is 0.4 cm, corresponding to a gestational age of 5 weeks and 0 days. It has increased in size when compared to prior imaging dated 2021 however no yolk sac or embryo is visible. It has a misshapen appearance and a small subchorionic hemorrhage is present..    The uterus is anteverted. The uterus is 7.9 x 4.3 x 4.0 cm. No myometrial abnormalities are seen.  The cervix is closed with a length of 3.5 cm.    The right ovary is normal in size, measuring 4.6 x 3.0 x 2.9 cm. Again seen is a complex cystic structure with peripheral vascularity measuring 2.1 x 1.9 x 2.3 cm within the right ovary which may represent a hemorrhagic corpus luteal cyst.  There is a  right paraovarian cyst measuring 1.5 x 1.2 x 1.7 cm.. The left ovary is normal in size, measuring 2.6 x 1.1 x 1.7 cm. No left ovarian masses are seen.    Doppler evaluation demonstrates flow to both ovaries with no evidence of torsion.    Moderate free fluid within the cul de sac.      IMPRESSION:  1.  Probable early singleintrauterine gestational sac with a mean sac diameter of 0.4 cm consistent with a gestational age of 5 weeks 0 days. No yolk sac or embryo visible.  2.  Small subchorionic hemorrhage.  3.  Moderate free fluid within the cul de sac.  4.  Probable hemorrhagic right-sided corpus luteal cyst.  Continued short interval surveillance to include serial beta hCG and a short follow-up obstetrical ultrasound in 7-10 days and/or earlier as clinically indicated to confirm viability.

## 2021-04-21 NOTE — CHART NOTE - NSCHARTNOTEFT_GEN_A_CORE
PGY2 GYN Note    Patient called at provided phone number by myself and BERTA Palmer (earlier @ 12 pm). At 12 pm, patient stated she has not come to clinic on scheduled appointment 4/20 for evaluation of her pregnancy of unknown location. PGY2 GYN Note    Patient called at provided phone number by myself (and BERTA Palmer earlier @ 2 pm). At 2 pm, patient stated she was unable to come to clinic on scheduled appointment 4/20 for evaluation of her pregnancy of unknown location. Gyn team advised her to come to the ED today 4/21 for evaluation; patient states she does not want to come to the ED. Conversation ended on whether or not patient could be seen today in clinic.     2:35PM   Patient was called by myself and was unable to be reached; 3x, and then a voicemail was left telling the patient that she could be seen today as the last patient in clinic, or to otherwise go to the ED for evaluation. Importance was stressed over voicemail.     Angel Abreu PGY2

## 2021-04-22 NOTE — CHART NOTE - NSCHARTNOTEFT_GEN_A_CORE
Pt called this AM to remind to come in to the OBGYN office.   Called pt and it went straight to  and said it was full. Will try again tomorrow.

## 2021-04-26 NOTE — CHART NOTE - NSCHARTNOTEFT_GEN_A_CORE
04-26-21 @ 06:58    Dear Ms. RAMESH ARELLANO,      We are writing to follow up about your last visit to the emergency department at [facility] regarding your ongoing medical condition. We have tried contacting you several times via telephone over the last week and were not able to get a hold of you. We urgently advise you to follow up and return to [facility] emergency department, or any emergency department, for additional evaluation. Please call [phone] with any questions.       Thank you,        Viji Garcia PA-C  Obstetrics and Gynecology   [facility]   [address] [place] [address]  [phone]